# Patient Record
Sex: FEMALE | Race: BLACK OR AFRICAN AMERICAN | NOT HISPANIC OR LATINO
[De-identification: names, ages, dates, MRNs, and addresses within clinical notes are randomized per-mention and may not be internally consistent; named-entity substitution may affect disease eponyms.]

---

## 2019-04-17 ENCOUNTER — APPOINTMENT (OUTPATIENT)
Dept: OBGYN | Facility: CLINIC | Age: 49
End: 2019-04-17
Payer: COMMERCIAL

## 2019-04-17 VITALS — BODY MASS INDEX: 27.96 KG/M2 | DIASTOLIC BLOOD PRESSURE: 65 MMHG | SYSTOLIC BLOOD PRESSURE: 116 MMHG | WEIGHT: 168 LBS

## 2019-04-17 DIAGNOSIS — Z01.419 ENCOUNTER FOR GYNECOLOGICAL EXAMINATION (GENERAL) (ROUTINE) W/OUT ABNORMAL FINDINGS: ICD-10-CM

## 2019-04-17 DIAGNOSIS — N92.0 EXCESSIVE AND FREQUENT MENSTRUATION WITH REGULAR CYCLE: ICD-10-CM

## 2019-04-17 PROCEDURE — 99386 PREV VISIT NEW AGE 40-64: CPT

## 2019-04-17 PROCEDURE — 99213 OFFICE O/P EST LOW 20 MIN: CPT | Mod: 25

## 2019-04-17 RX ORDER — METRONIDAZOLE 7.5 MG/G
0.75 GEL VAGINAL
Qty: 7 | Refills: 2 | Status: ACTIVE | COMMUNITY
Start: 2019-04-17 | End: 1900-01-01

## 2019-04-17 NOTE — HISTORY OF PRESENT ILLNESS
[___ Year(s) Ago] : [unfilled] year(s) ago [Healthy Diet] : a healthy diet [Fair] : being in fair health [Weight Concerns] : no concerns with her weight [Last Pap ___] : Last cervical pap smear was [unfilled] [Menstrual Problems] : reports abnormal menses [Reproductive Age] : is of reproductive age [Definite ___ (Date)] : the last menstrual period was [unfilled] [Excessive Bleeding] : bleeding has been excessive [Contraception] : uses contraception [Regular Duration] : has been regular [IUD] : has an intrauterine device [Discharge] : discharge [Vagina] : vagina [Vaginal Odor Foul] : foul vaginal odor [Normal Amount/Duration] : was abnormal [Spotting Between  Menses] : no spotting between menses [Regular Cycle Intervals] : periods have been regular [Prior Menses:  ___ Date] : date of prior menstruation was [unfilled] [Sexually Active] : is sexually active

## 2019-04-17 NOTE — PROCEDURE
[Cervical Pap Smear] : cervical Pap smear [Affirm (Triple Culture)] : Affirm (triple culture) [Liquid Base] : liquid base

## 2019-04-17 NOTE — PHYSICAL EXAM
[Alert] : alert [Awake] : awake [Acute Distress] : no acute distress [Mass] : no breast mass [Nipple Discharge] : no nipple discharge [Soft] : soft [Axillary LAD] : no axillary lymphadenopathy [Tender] : non tender [Oriented x3] : oriented to person, place, and time [Normal] : uterus [No Bleeding] : there was no active vaginal bleeding [IUD String] : had an IUD string protruding out [Enlarged ___ wks] : enlarged [unfilled] ~Uweeks [Uterine Adnexae] : were not tender and not enlarged

## 2019-04-22 ENCOUNTER — APPOINTMENT (OUTPATIENT)
Dept: OBGYN | Facility: CLINIC | Age: 49
End: 2019-04-22
Payer: COMMERCIAL

## 2019-04-22 ENCOUNTER — ASOB RESULT (OUTPATIENT)
Age: 49
End: 2019-04-22

## 2019-04-22 PROCEDURE — 76830 TRANSVAGINAL US NON-OB: CPT

## 2019-04-25 ENCOUNTER — OTHER (OUTPATIENT)
Age: 49
End: 2019-04-25

## 2019-04-25 LAB
CANDIDA VAG CYTO: DETECTED
CYTOLOGY CVX/VAG DOC THIN PREP: NORMAL
G VAGINALIS+PREV SP MTYP VAG QL MICRO: DETECTED
HPV HIGH+LOW RISK DNA PNL CVX: NOT DETECTED
T VAGINALIS VAG QL WET PREP: NOT DETECTED

## 2019-04-25 RX ORDER — TERCONAZOLE 8 MG/G
0.8 CREAM VAGINAL DAILY
Qty: 3 | Refills: 0 | Status: ACTIVE | COMMUNITY
Start: 2019-04-25 | End: 1900-01-01

## 2019-05-07 ENCOUNTER — FORM ENCOUNTER (OUTPATIENT)
Age: 49
End: 2019-05-07

## 2019-05-08 ENCOUNTER — APPOINTMENT (OUTPATIENT)
Dept: MAMMOGRAPHY | Facility: CLINIC | Age: 49
End: 2019-05-08
Payer: COMMERCIAL

## 2019-05-08 ENCOUNTER — APPOINTMENT (OUTPATIENT)
Dept: ULTRASOUND IMAGING | Facility: CLINIC | Age: 49
End: 2019-05-08
Payer: COMMERCIAL

## 2019-05-08 ENCOUNTER — OUTPATIENT (OUTPATIENT)
Dept: OUTPATIENT SERVICES | Facility: HOSPITAL | Age: 49
LOS: 1 days | End: 2019-05-08
Payer: COMMERCIAL

## 2019-05-08 DIAGNOSIS — Z00.8 ENCOUNTER FOR OTHER GENERAL EXAMINATION: ICD-10-CM

## 2019-05-08 PROCEDURE — 76641 ULTRASOUND BREAST COMPLETE: CPT

## 2019-05-08 PROCEDURE — 77063 BREAST TOMOSYNTHESIS BI: CPT

## 2019-05-08 PROCEDURE — 76641 ULTRASOUND BREAST COMPLETE: CPT | Mod: 26,50

## 2019-05-08 PROCEDURE — 77067 SCR MAMMO BI INCL CAD: CPT | Mod: 26

## 2019-05-08 PROCEDURE — 77063 BREAST TOMOSYNTHESIS BI: CPT | Mod: 26

## 2019-05-08 PROCEDURE — 77067 SCR MAMMO BI INCL CAD: CPT

## 2019-06-18 ENCOUNTER — OTHER (OUTPATIENT)
Age: 49
End: 2019-06-18

## 2019-08-09 ENCOUNTER — APPOINTMENT (OUTPATIENT)
Dept: OBGYN | Facility: CLINIC | Age: 49
End: 2019-08-09
Payer: COMMERCIAL

## 2019-08-09 VITALS — SYSTOLIC BLOOD PRESSURE: 122 MMHG | BODY MASS INDEX: 27.46 KG/M2 | WEIGHT: 165 LBS | DIASTOLIC BLOOD PRESSURE: 74 MMHG

## 2019-08-09 DIAGNOSIS — R80.2 ORTHOSTATIC PROTEINURIA, UNSPECIFIED: ICD-10-CM

## 2019-08-09 DIAGNOSIS — Z87.440 PERSONAL HISTORY OF URINARY (TRACT) INFECTIONS: ICD-10-CM

## 2019-08-09 DIAGNOSIS — N89.8 OTHER SPECIFIED NONINFLAMMATORY DISORDERS OF VAGINA: ICD-10-CM

## 2019-08-09 DIAGNOSIS — Z86.19 PERSONAL HISTORY OF OTHER INFECTIOUS AND PARASITIC DISEASES: ICD-10-CM

## 2019-08-09 DIAGNOSIS — R31.9 HEMATURIA, UNSPECIFIED: ICD-10-CM

## 2019-08-09 LAB
BILIRUB UR QL STRIP: NORMAL
GLUCOSE UR-MCNC: NORMAL
HCG UR QL: 2 EU/DL
HGB UR QL STRIP.AUTO: NORMAL
KETONES UR-MCNC: NORMAL
LEUKOCYTE ESTERASE UR QL STRIP: NORMAL
NITRITE UR QL STRIP: NORMAL
PH UR STRIP: 7
PROT UR STRIP-MCNC: NORMAL
SP GR UR STRIP: 1.02

## 2019-08-09 PROCEDURE — 81002 URINALYSIS NONAUTO W/O SCOPE: CPT

## 2019-08-09 PROCEDURE — 99212 OFFICE O/P EST SF 10 MIN: CPT

## 2019-08-09 RX ORDER — METRONIDAZOLE 7.5 MG/G
0.75 GEL VAGINAL
Qty: 1 | Refills: 3 | Status: ACTIVE | COMMUNITY
Start: 2019-08-09 | End: 1900-01-01

## 2019-08-09 NOTE — PHYSICAL EXAM
[Labia Majora] : labia major [Labia Minora] : labia minora [Normal] : clitoris [FreeTextEntry4] : moderate white homogenous discharge Affirm done [FreeTextEntry7] : Bladder non tender

## 2019-08-12 LAB
BACTERIA UR CULT: ABNORMAL
CANDIDA VAG CYTO: DETECTED
G VAGINALIS+PREV SP MTYP VAG QL MICRO: DETECTED
T VAGINALIS VAG QL WET PREP: NOT DETECTED

## 2019-08-12 RX ORDER — FLUCONAZOLE 150 MG/1
150 TABLET ORAL
Qty: 2 | Refills: 1 | Status: ACTIVE | COMMUNITY
Start: 2019-08-12 | End: 1900-01-01

## 2019-08-12 RX ORDER — NITROFURANTOIN (MONOHYDRATE/MACROCRYSTALS) 25; 75 MG/1; MG/1
100 CAPSULE ORAL
Qty: 10 | Refills: 0 | Status: ACTIVE | COMMUNITY
Start: 2019-08-12 | End: 1900-01-01

## 2020-02-05 ENCOUNTER — APPOINTMENT (OUTPATIENT)
Dept: OBGYN | Facility: CLINIC | Age: 50
End: 2020-02-05
Payer: COMMERCIAL

## 2020-02-05 VITALS — BODY MASS INDEX: 25.63 KG/M2 | WEIGHT: 154 LBS | DIASTOLIC BLOOD PRESSURE: 75 MMHG | SYSTOLIC BLOOD PRESSURE: 107 MMHG

## 2020-02-05 DIAGNOSIS — N89.8 OTHER SPECIFIED NONINFLAMMATORY DISORDERS OF VAGINA: ICD-10-CM

## 2020-02-05 PROCEDURE — 99213 OFFICE O/P EST LOW 20 MIN: CPT

## 2020-02-05 NOTE — PHYSICAL EXAM
[Normal] : uterus [Discharge] : a  ~M vaginal discharge was present [No Bleeding] : there was no active vaginal bleeding [Uterine Adnexae] : were not tender and not enlarged [IUD String] : had an IUD string protruding out

## 2020-02-11 LAB
CANDIDA VAG CYTO: NOT DETECTED
G VAGINALIS+PREV SP MTYP VAG QL MICRO: DETECTED
T VAGINALIS VAG QL WET PREP: NOT DETECTED

## 2020-02-11 RX ORDER — TINIDAZOLE 500 MG/1
500 TABLET, FILM COATED ORAL DAILY
Qty: 10 | Refills: 0 | Status: ACTIVE | COMMUNITY
Start: 2020-02-11 | End: 1900-01-01

## 2020-02-26 ENCOUNTER — ASOB RESULT (OUTPATIENT)
Age: 50
End: 2020-02-26

## 2020-02-26 ENCOUNTER — APPOINTMENT (OUTPATIENT)
Dept: OBGYN | Facility: CLINIC | Age: 50
End: 2020-02-26
Payer: COMMERCIAL

## 2020-02-26 DIAGNOSIS — Z30.432 ENCOUNTER FOR REMOVAL OF INTRAUTERINE CONTRACEPTIVE DEVICE: ICD-10-CM

## 2020-02-26 DIAGNOSIS — Z30.430 ENCOUNTER FOR INSERTION OF INTRAUTERINE CONTRACEPTIVE DEVICE: ICD-10-CM

## 2020-02-26 PROCEDURE — 58301 REMOVE INTRAUTERINE DEVICE: CPT

## 2020-02-26 PROCEDURE — 58300 INSERT INTRAUTERINE DEVICE: CPT

## 2020-02-26 PROCEDURE — 76830 TRANSVAGINAL US NON-OB: CPT

## 2020-02-26 NOTE — PROCEDURE
[Prevention of Pregnancy] : prevention of pregnancy [Bleeding] : bleeding [Infection] : infection [Expulsion] : expulsion [Pain] : pain [Failure] : failure [Uterine Perforation] : uterine perforation [CONSENT OBTAINED] : written consent was obtained prior to the procedure. [LMP ___] : LMP was [unfilled] [No Premedication] : No premedication [Tenaculum] : a single toothed tenaculum [Uterus Sounded to ___cm] : sounded to [unfilled]Ucm [Mirena IUD] : The Mirena IUD was inserted past the internal cervical os. The IUD was then gently inserted upwards toward the fundus.  The IUD strings were cut to an appropriate length. [Easy Passage] : allowed easy passage of a uterine sound without dilation [Lot Number: ___] : IUD lot number: [unfilled] [IUD Removal] : IUD [None] : no post-procedure medications given [ IUD] :  IUD [Paraguard] : Franck [Risks] : risks [Patient] : patient [Benefits] : benefits [Alternatives] : alternatives [Consent Obtained] : consent was obtained prior to the procedure and is detailed in the patient's record [Speculum Placed] : a speculum was placed in the vagina [Strings Visualized] : the IUD strings were visualized [IUD Removed - Forceps] : the strings were grasped with forceps and the IUD was removed [IUD Discarded] : discarded [Tolerated Well] : the patient tolerated the procedure well [No Complications] : none [___ Week(s)] : in [unfilled] week(s)

## 2020-05-27 ENCOUNTER — APPOINTMENT (OUTPATIENT)
Dept: OBGYN | Facility: CLINIC | Age: 50
End: 2020-05-27

## 2020-11-20 ENCOUNTER — NON-APPOINTMENT (OUTPATIENT)
Age: 50
End: 2020-11-20

## 2020-11-23 ENCOUNTER — APPOINTMENT (OUTPATIENT)
Dept: OBGYN | Facility: CLINIC | Age: 50
End: 2020-11-23
Payer: COMMERCIAL

## 2020-11-23 ENCOUNTER — ASOB RESULT (OUTPATIENT)
Age: 50
End: 2020-11-23

## 2020-11-23 ENCOUNTER — APPOINTMENT (OUTPATIENT)
Dept: OBGYN | Facility: CLINIC | Age: 50
End: 2020-11-23

## 2020-11-23 VITALS
WEIGHT: 165 LBS | DIASTOLIC BLOOD PRESSURE: 69 MMHG | SYSTOLIC BLOOD PRESSURE: 115 MMHG | BODY MASS INDEX: 27.49 KG/M2 | HEIGHT: 65 IN

## 2020-11-23 DIAGNOSIS — D21.9 BENIGN NEOPLASM OF CONNECTIVE AND OTHER SOFT TISSUE, UNSPECIFIED: ICD-10-CM

## 2020-11-23 DIAGNOSIS — N92.6 IRREGULAR MENSTRUATION, UNSPECIFIED: ICD-10-CM

## 2020-11-23 DIAGNOSIS — Z97.5 PRESENCE OF (INTRAUTERINE) CONTRACEPTIVE DEVICE: ICD-10-CM

## 2020-11-23 PROCEDURE — 99214 OFFICE O/P EST MOD 30 MIN: CPT

## 2020-11-23 PROCEDURE — 76830 TRANSVAGINAL US NON-OB: CPT

## 2020-11-23 NOTE — HISTORY OF PRESENT ILLNESS
[Irregular Menses] : irregular menses [Heavy Bleeding] : described as heavy in severity [IUD] : using an IUD [Fibroids] : fibroids

## 2020-11-23 NOTE — PHYSICAL EXAM
[Normal] : uterus [Moderate] : there was moderate vaginal bleeding [IUD String] : did not have an IUD string protruding out [Uterine Adnexae] : were not tender and not enlarged

## 2020-12-21 PROBLEM — Z86.19 HISTORY OF CANDIDIASIS OF VAGINA: Status: RESOLVED | Noted: 2019-08-12 | Resolved: 2020-12-21

## 2020-12-21 PROBLEM — Z87.440 HISTORY OF URINARY TRACT INFECTION: Status: RESOLVED | Noted: 2019-08-12 | Resolved: 2020-12-21

## 2020-12-21 PROBLEM — Z01.419 ENCOUNTER FOR GYNECOLOGICAL EXAMINATION: Status: RESOLVED | Noted: 2019-04-17 | Resolved: 2020-12-21

## 2020-12-22 ENCOUNTER — FORM ENCOUNTER (OUTPATIENT)
Age: 50
End: 2020-12-22

## 2020-12-23 ENCOUNTER — APPOINTMENT (OUTPATIENT)
Dept: OBGYN | Facility: CLINIC | Age: 50
End: 2020-12-23
Payer: COMMERCIAL

## 2020-12-23 PROCEDURE — 99243 OFF/OP CNSLTJ NEW/EST LOW 30: CPT

## 2020-12-23 PROCEDURE — 99072 ADDL SUPL MATRL&STAF TM PHE: CPT

## 2021-01-11 ENCOUNTER — FORM ENCOUNTER (OUTPATIENT)
Age: 51
End: 2021-01-11

## 2021-02-24 ENCOUNTER — FORM ENCOUNTER (OUTPATIENT)
Age: 51
End: 2021-02-24

## 2021-02-25 ENCOUNTER — RESULT REVIEW (OUTPATIENT)
Age: 51
End: 2021-02-25

## 2021-02-25 ENCOUNTER — APPOINTMENT (OUTPATIENT)
Dept: OBGYN | Facility: CLINIC | Age: 51
End: 2021-02-25
Payer: COMMERCIAL

## 2021-02-25 PROCEDURE — 58100 BIOPSY OF UTERUS LINING: CPT

## 2021-02-25 PROCEDURE — 99072 ADDL SUPL MATRL&STAF TM PHE: CPT

## 2021-02-28 ENCOUNTER — FORM ENCOUNTER (OUTPATIENT)
Age: 51
End: 2021-02-28

## 2021-03-17 ENCOUNTER — FORM ENCOUNTER (OUTPATIENT)
Age: 51
End: 2021-03-17

## 2021-03-24 ENCOUNTER — APPOINTMENT (OUTPATIENT)
Dept: MRI IMAGING | Facility: CLINIC | Age: 51
End: 2021-03-24
Payer: COMMERCIAL

## 2021-03-24 ENCOUNTER — RESULT REVIEW (OUTPATIENT)
Age: 51
End: 2021-03-24

## 2021-03-24 ENCOUNTER — OUTPATIENT (OUTPATIENT)
Dept: OUTPATIENT SERVICES | Facility: HOSPITAL | Age: 51
LOS: 1 days | End: 2021-03-24
Payer: COMMERCIAL

## 2021-03-24 DIAGNOSIS — Z00.8 ENCOUNTER FOR OTHER GENERAL EXAMINATION: ICD-10-CM

## 2021-03-24 PROCEDURE — 72195 MRI PELVIS W/O DYE: CPT | Mod: 26

## 2021-03-24 PROCEDURE — 72195 MRI PELVIS W/O DYE: CPT

## 2022-06-14 DIAGNOSIS — Z86.39 PERSONAL HISTORY OF OTHER ENDOCRINE, NUTRITIONAL AND METABOLIC DISEASE: ICD-10-CM

## 2022-06-14 DIAGNOSIS — Z83.3 FAMILY HISTORY OF DIABETES MELLITUS: ICD-10-CM

## 2022-06-14 RX ORDER — ASCORBIC ACID 500 MG
TABLET ORAL
Refills: 0 | Status: ACTIVE | COMMUNITY

## 2022-06-14 RX ORDER — CHROMIUM 200 MCG
TABLET ORAL
Refills: 0 | Status: ACTIVE | COMMUNITY

## 2022-06-14 RX ORDER — IRON/IRON ASP GLY/FA/MV-MIN 38 125-25-1MG
TABLET ORAL
Refills: 0 | Status: ACTIVE | COMMUNITY

## 2022-07-07 ENCOUNTER — APPOINTMENT (OUTPATIENT)
Dept: OBGYN | Facility: CLINIC | Age: 52
End: 2022-07-07

## 2023-02-14 ENCOUNTER — EMERGENCY (EMERGENCY)
Facility: HOSPITAL | Age: 53
LOS: 1 days | Discharge: HOME | End: 2023-02-14
Attending: EMERGENCY MEDICINE | Admitting: EMERGENCY MEDICINE
Payer: COMMERCIAL

## 2023-02-14 VITALS
DIASTOLIC BLOOD PRESSURE: 68 MMHG | HEART RATE: 78 BPM | RESPIRATION RATE: 18 BRPM | TEMPERATURE: 99 F | SYSTOLIC BLOOD PRESSURE: 114 MMHG | OXYGEN SATURATION: 99 %

## 2023-02-14 VITALS
HEART RATE: 82 BPM | OXYGEN SATURATION: 100 % | DIASTOLIC BLOOD PRESSURE: 75 MMHG | RESPIRATION RATE: 18 BRPM | TEMPERATURE: 99 F | SYSTOLIC BLOOD PRESSURE: 126 MMHG

## 2023-02-14 DIAGNOSIS — Z98.890 OTHER SPECIFIED POSTPROCEDURAL STATES: Chronic | ICD-10-CM

## 2023-02-14 LAB
ALBUMIN SERPL ELPH-MCNC: 4.3 G/DL — SIGNIFICANT CHANGE UP (ref 3.3–5)
ALP SERPL-CCNC: 91 U/L — SIGNIFICANT CHANGE UP (ref 40–120)
ALT FLD-CCNC: 12 U/L — SIGNIFICANT CHANGE UP (ref 4–33)
ANION GAP SERPL CALC-SCNC: 10 MMOL/L — SIGNIFICANT CHANGE UP (ref 7–14)
AST SERPL-CCNC: 21 U/L — SIGNIFICANT CHANGE UP (ref 4–32)
BASOPHILS # BLD AUTO: 0.02 K/UL — SIGNIFICANT CHANGE UP (ref 0–0.2)
BASOPHILS NFR BLD AUTO: 0.3 % — SIGNIFICANT CHANGE UP (ref 0–2)
BILIRUB SERPL-MCNC: 0.7 MG/DL — SIGNIFICANT CHANGE UP (ref 0.2–1.2)
BUN SERPL-MCNC: 8 MG/DL — SIGNIFICANT CHANGE UP (ref 7–23)
CALCIUM SERPL-MCNC: 9.9 MG/DL — SIGNIFICANT CHANGE UP (ref 8.4–10.5)
CHLORIDE SERPL-SCNC: 104 MMOL/L — SIGNIFICANT CHANGE UP (ref 98–107)
CO2 SERPL-SCNC: 25 MMOL/L — SIGNIFICANT CHANGE UP (ref 22–31)
CREAT SERPL-MCNC: 0.82 MG/DL — SIGNIFICANT CHANGE UP (ref 0.5–1.3)
EGFR: 87 ML/MIN/1.73M2 — SIGNIFICANT CHANGE UP
EOSINOPHIL # BLD AUTO: 0.07 K/UL — SIGNIFICANT CHANGE UP (ref 0–0.5)
EOSINOPHIL NFR BLD AUTO: 1.1 % — SIGNIFICANT CHANGE UP (ref 0–6)
FLUAV AG NPH QL: SIGNIFICANT CHANGE UP
FLUBV AG NPH QL: SIGNIFICANT CHANGE UP
GLUCOSE SERPL-MCNC: 103 MG/DL — HIGH (ref 70–99)
HCT VFR BLD CALC: 37.8 % — SIGNIFICANT CHANGE UP (ref 34.5–45)
HGB BLD-MCNC: 12.1 G/DL — SIGNIFICANT CHANGE UP (ref 11.5–15.5)
IANC: 4.7 K/UL — SIGNIFICANT CHANGE UP (ref 1.8–7.4)
IMM GRANULOCYTES NFR BLD AUTO: 0.2 % — SIGNIFICANT CHANGE UP (ref 0–0.9)
LYMPHOCYTES # BLD AUTO: 1.06 K/UL — SIGNIFICANT CHANGE UP (ref 1–3.3)
LYMPHOCYTES # BLD AUTO: 17.1 % — SIGNIFICANT CHANGE UP (ref 13–44)
MCHC RBC-ENTMCNC: 26.1 PG — LOW (ref 27–34)
MCHC RBC-ENTMCNC: 32 GM/DL — SIGNIFICANT CHANGE UP (ref 32–36)
MCV RBC AUTO: 81.5 FL — SIGNIFICANT CHANGE UP (ref 80–100)
MONOCYTES # BLD AUTO: 0.33 K/UL — SIGNIFICANT CHANGE UP (ref 0–0.9)
MONOCYTES NFR BLD AUTO: 5.3 % — SIGNIFICANT CHANGE UP (ref 2–14)
NEUTROPHILS # BLD AUTO: 4.7 K/UL — SIGNIFICANT CHANGE UP (ref 1.8–7.4)
NEUTROPHILS NFR BLD AUTO: 76 % — SIGNIFICANT CHANGE UP (ref 43–77)
NRBC # BLD: 0 /100 WBCS — SIGNIFICANT CHANGE UP (ref 0–0)
NRBC # FLD: 0 K/UL — SIGNIFICANT CHANGE UP (ref 0–0)
PLATELET # BLD AUTO: 178 K/UL — SIGNIFICANT CHANGE UP (ref 150–400)
POTASSIUM SERPL-MCNC: 4 MMOL/L — SIGNIFICANT CHANGE UP (ref 3.5–5.3)
POTASSIUM SERPL-SCNC: 4 MMOL/L — SIGNIFICANT CHANGE UP (ref 3.5–5.3)
PROT SERPL-MCNC: 8.1 G/DL — SIGNIFICANT CHANGE UP (ref 6–8.3)
RBC # BLD: 4.64 M/UL — SIGNIFICANT CHANGE UP (ref 3.8–5.2)
RBC # FLD: 17.8 % — HIGH (ref 10.3–14.5)
RSV RNA NPH QL NAA+NON-PROBE: SIGNIFICANT CHANGE UP
SARS-COV-2 RNA SPEC QL NAA+PROBE: SIGNIFICANT CHANGE UP
SODIUM SERPL-SCNC: 139 MMOL/L — SIGNIFICANT CHANGE UP (ref 135–145)
WBC # BLD: 6.19 K/UL — SIGNIFICANT CHANGE UP (ref 3.8–10.5)
WBC # FLD AUTO: 6.19 K/UL — SIGNIFICANT CHANGE UP (ref 3.8–10.5)

## 2023-02-14 PROCEDURE — 70490 CT SOFT TISSUE NECK W/O DYE: CPT | Mod: 26,MG

## 2023-02-14 PROCEDURE — 99285 EMERGENCY DEPT VISIT HI MDM: CPT

## 2023-02-14 PROCEDURE — G1004: CPT

## 2023-02-14 RX ORDER — AMPICILLIN SODIUM AND SULBACTAM SODIUM 250; 125 MG/ML; MG/ML
3 INJECTION, POWDER, FOR SUSPENSION INTRAMUSCULAR; INTRAVENOUS ONCE
Refills: 0 | Status: COMPLETED | OUTPATIENT
Start: 2023-02-14 | End: 2023-02-14

## 2023-02-14 RX ORDER — KETOROLAC TROMETHAMINE 30 MG/ML
15 SYRINGE (ML) INJECTION ONCE
Refills: 0 | Status: DISCONTINUED | OUTPATIENT
Start: 2023-02-14 | End: 2023-02-14

## 2023-02-14 RX ADMIN — Medication 15 MILLIGRAM(S): at 10:16

## 2023-02-14 RX ADMIN — AMPICILLIN SODIUM AND SULBACTAM SODIUM 200 GRAM(S): 250; 125 INJECTION, POWDER, FOR SUSPENSION INTRAMUSCULAR; INTRAVENOUS at 10:25

## 2023-02-14 NOTE — ED PROVIDER NOTE - OBJECTIVE STATEMENT
52F with PMH hypothyroidism who presents to ED with left-sided facial swelling x 4 days. Denies fever, chills, chest pain, shortness of breath, abdominal pain, N/V/D, dysuria, urinary frequency/urgency, extremity weakness/numbness/tingling, lightheadedness, dizziness, or headaches. 52F with PMH hypothyroidism who presents to ED with left-sided facial swelling x 4 days. Reporting gradual onset of left-sided facial swelling over the past 4 days, pain is worsened when patient touches the affected area, no associated neck pain, no difficulty swallowing, no trauma/reported injury. Pt saw dentist Dr. Lorie Muse for symptoms and was told there was a fracture to tooth #19 with swelling, pt was not started on antibiotics, referred to ED for CT to r/o abscess and for further intervention. Denies fever, chills, chest pain, shortness of breath, difficulty swallowing, abdominal pain, N/V/D, dysuria, urinary frequency/urgency, extremity weakness/numbness/tingling, lightheadedness, neck pain, dizziness, or headaches. 52F with PMH Hypothyroidism who presents to ED with left-sided facial swelling x 4 days. Reporting gradual onset of left-sided facial swelling over the past 4 days, pain is worsened when patient touches the affected area, currently no pain at rest, pt has not taken any OTC medications for relief, no associated neck pain, no difficulty swallowing, no trauma/reported injury. Pt saw dentist Dr. Lorie Muse for symptoms and was told there was a fracture to tooth #19 with surrounding swelling, pt was not started on antibiotics, referred to ED for CT to r/o abscess and for further intervention. Denies history of similar symptoms in the past. Denies fever, chills, chest pain, shortness of breath, difficulty swallowing, abdominal pain, N/V/D, dysuria, urinary frequency/urgency, extremity weakness/numbness/tingling, lightheadedness, neck pain, dizziness, or headaches.

## 2023-02-14 NOTE — ED ADULT NURSE NOTE - OBJECTIVE STATEMENT
Pt received to intake room 15 A&XO4 ambulatory c/o tooth pain x 4 days. Pt states "I think it's infected." Denies chills/fevers. 20G IV placed in LAC. Labs drawn and sent. Medicated as per EMAR. Pending CT.

## 2023-02-14 NOTE — ED PROVIDER NOTE - CLINICAL SUMMARY MEDICAL DECISION MAKING FREE TEXT BOX
52F with PMH hypothyroidism who presents to ED with left-sided facial swelling x 4 days. Patient currently afebrile, hemodynamically stable, spO2 100%. Based on history and physical, differentials include but are not limited to dental fracture, dental abscess, submandibular/sublingual abscess. Plan to assess patient for acute pathology as listed above with Labs, CT Maxillofacial. Will administer IV antibiotics, medications for pain relief, follow-up on results, and reassess. 52F with PMH hypothyroidism who presents to ED with left-sided facial swelling x 4 days. Patient currently afebrile, hemodynamically stable, spO2 100%. Based on history and physical, differentials include but are not limited to dental fracture, dental abscess, submandibular/sublingual abscess. Plan to assess patient for acute pathology as listed above with Labs, CT Maxillofacial/Neck. Will administer IV antibiotics, medications for pain relief, follow-up on results, and reassess.

## 2023-02-14 NOTE — ED PROVIDER NOTE - LEFT FACE
left-sided mandibular swelling with tenderness to palpation/SWELLING/TENDERNESS TO PALPATION Left-sided mandibular swelling with tenderness to palpation, No TMJ or mastoid ttp/SWELLING/TENDERNESS TO PALPATION

## 2023-02-14 NOTE — PROGRESS NOTE ADULT - SUBJECTIVE AND OBJECTIVE BOX
MEDICATIONS  (STANDING):    MEDICATIONS  (PRN):      Allergies    No Known Allergies      Vital Signs Last 24 Hrs  T(C): 37.2 (14 Feb 2023 12:47), Max: 37.2 (14 Feb 2023 12:47)  T(F): 99 (14 Feb 2023 12:47), Max: 99 (14 Feb 2023 12:47)  HR: 69 (14 Feb 2023 12:47) (69 - 82)  BP: 108/64 (14 Feb 2023 12:47) (108/64 - 126/75)  BP(mean): --  RR: 18 (14 Feb 2023 12:47) (18 - 18)  SpO2: 100% (14 Feb 2023 12:47) (100% - 100%)    Parameters below as of 14 Feb 2023 12:47  Patient On (Oxygen Delivery Method): room air        LABS:                        12.1   6.19  )-----------( 178      ( 14 Feb 2023 10:15 )             37.8     02-14    139  |  104  |  8   ----------------------------<  103<H>  4.0   |  25  |  0.82    Ca    9.9      14 Feb 2023 10:15    TPro  8.1  /  Alb  4.3  /  TBili  0.7  /  DBili  x   /  AST  21  /  ALT  12  /  AlkPhos  91  02-14    WBC Count: 6.19 K/uL [3.80 - 10.50] (02-14 @ 10:15)  Platelet Count - Automated: 178 K/uL [150 - 400] (02-14 @ 10:15)    Dental paged for tooth pain and swelling.  HPI: Pt states tooth pain and swelling began 4 days ago.    CLINICAL EXAM:  EOE:   (+) asymmetry and swelling, left body of the mandible. Palpable angle of the mandible.  (+) palpation, in the area of swelling    IOE: Vestibular fluctuance noted in the lower left vestibule approximate to tooth #19. Pain on palpation.     DENTAL RADIOGRAPHS: Panoramic image taken and reviewed. Fractured, carious #19 with large M root PARL.  No hard tissue pathology.    ASSESSMENT: Vestibular abscess associated with tooth #19.   PLAN: Incision and drainage of lower left vestibular area    PROCEDURE: Incision and drainage of lower left vestibular area. Discussed procedural details as well as risks and benefits.   Verbal and written informed consent given. All pt questions answered.  Anesthesia: 1 carpule of 2% lidocaine with 1:100,000 epinephrine administered via L IANB and 1 carpule of 3% mepivicaine plain administered via local infiltration with changing of needles.   Procedure: Incised with #15 blade. Blunt dissection of fascial planes to bones. Irrigated with copious amounts of saline.     RECOMMENDATIONS:  1) Antibiotics and pain medications as per ED Team  2) Follow up with outpatient dentist or [facility] ([phone]) for extraction of #19     Liv Ang, DMD #[id number]   MEDICATIONS  (STANDING):    MEDICATIONS  (PRN):      Allergies    No Known Allergies      Vital Signs Last 24 Hrs  T(C): 37.2 (14 Feb 2023 12:47), Max: 37.2 (14 Feb 2023 12:47)  T(F): 99 (14 Feb 2023 12:47), Max: 99 (14 Feb 2023 12:47)  HR: 69 (14 Feb 2023 12:47) (69 - 82)  BP: 108/64 (14 Feb 2023 12:47) (108/64 - 126/75)  BP(mean): --  RR: 18 (14 Feb 2023 12:47) (18 - 18)  SpO2: 100% (14 Feb 2023 12:47) (100% - 100%)    Parameters below as of 14 Feb 2023 12:47  Patient On (Oxygen Delivery Method): room air        LABS:                        12.1   6.19  )-----------( 178      ( 14 Feb 2023 10:15 )             37.8     02-14    139  |  104  |  8   ----------------------------<  103<H>  4.0   |  25  |  0.82    Ca    9.9      14 Feb 2023 10:15    TPro  8.1  /  Alb  4.3  /  TBili  0.7  /  DBili  x   /  AST  21  /  ALT  12  /  AlkPhos  91  02-14    WBC Count: 6.19 K/uL [3.80 - 10.50] (02-14 @ 10:15)  Platelet Count - Automated: 178 K/uL [150 - 400] (02-14 @ 10:15)    Dental paged for tooth pain and swelling.  HPI: Pt states tooth pain and swelling began 4 days ago.    CLINICAL EXAM:  EOE:   (+) asymmetry and swelling, left body of the mandible. Palpable angle of the mandible.  (+) palpation, in the area of swelling    IOE: Vestibular fluctuance noted in the lower left vestibule approximate to tooth #19. Pain on palpation.     DENTAL RADIOGRAPHS: Panoramic image taken and reviewed. Fractured, carious #19 with large M root PARL.  No hard tissue pathology.    ASSESSMENT: Vestibular abscess associated with tooth #19.   PLAN: Incision and drainage of lower left vestibular area    PROCEDURE: Incision and drainage of lower left vestibular area. Discussed procedural details as well as risks and benefits.   Verbal informed consent given. All pt questions answered.  Anesthesia: 1 carpule of 2% lidocaine with 1:100,000 epinephrine administered via L IANB and 1 carpule of 3% mepivicaine plain administered via local infiltration with changing of needles.   Procedure: Incised with #15 blade. Blunt dissection of fascial planes to bones. Irrigated with copious amounts of saline.     RECOMMENDATIONS:  1) Antibiotics and pain medications as per ED Team  2) Follow up with outpatient dentist or [facility] ([phone]) for extraction of #19     Liv Ang, DMD #[id number]

## 2023-02-14 NOTE — ED PROVIDER NOTE - PATIENT PORTAL LINK FT
You can access the FollowMyHealth Patient Portal offered by [facility] by registering at the following website: [email or web address]. By joining [email or web address]’s FollowMyHealth portal, you will also be able to view your health information using other applications (apps) compatible with our system.

## 2023-02-14 NOTE — ED PROVIDER NOTE - ATTENDING CONTRIBUTION TO CARE
I performed a face to face evaluation of this patient and performed a full history and physical examination on the patient.  I agree with the resident's history, physical examination, and plan of the patient.  52F with PMH hypothyroidism who presents to ED with left-sided facial swelling x 4 days. Patient currently afebrile, hemodynamically stable, spO2 100%. Based on history and physical, differentials include but are not limited to dental fracture, dental abscess, submandibular/sublingual abscess. Plan to assess patient for acute pathology as listed above with Labs, CT Maxillofacial/Neck. Will administer IV antibiotics, medications for pain relief, follow-up on results, and reassess.    Pt needs advanced workup including ct, labs, pain medications, and dental consult.  Concern for abscess, osteomyelitis, and possible need for cdu or admission pending CT results and dental consult

## 2023-02-14 NOTE — ED PROVIDER NOTE - NSFOLLOWUPINSTRUCTIONS_ED_ALL_ED_FT
Please call [facility] to make an appointment tomorrow for follow-up with [facility] for extraction of Tooth #19.    [facility]  Phone: [phone]  Address: [phone] [place], [place] [address]  Email: [email or web address]    Medications:  Augmentin 875 mg, two times a day (every 12 hours) for 1 week.    Take Tylenol (Acetaminophen) 650 mg every 6 hours AND/OR Motrin (Ibuprofen) 600 mg every 8 hours as needed for pain.    Advance activity as tolerated.  Continue all previously prescribed medications as directed unless otherwise instructed.  Follow up with your primary care physician in 48-72 hours- bring copies of your results.  Return to the ER for worsening or persistent symptoms, and/or ANY NEW OR CONCERNING SYMPTOMS high fever not improved with medications, worsening or spreading of swelling/pain/redness of the affected left side of the face, difficulty swallowing/breathing. If you have issues obtaining follow up, please call: [phone] to obtain a doctor or specialist who takes your insurance in your area.  You may call [phone] to make an appointment with the internal medicine clinic.       dental abscess is an area of pus in or around a tooth. It comes from an infection. It can cause pain and other symptoms. Treatment will help with symptoms and prevent the infection from spreading.      What are the causes?    This condition is caused by an infection in or around the tooth. This can be from:  •Very bad tooth decay (cavities).      •A bad injury to the tooth, such as a broken or chipped tooth.        What increases the risk?    The risk to get an abscess is higher in males. It is also more likely in people who:  •Have dental decay.      •Have very bad gum disease.      •Eat sugary snacks between meals.      •Use tobacco.      •Have diabetes.      •Have a weak disease-fighting system (immune system).      •Do not brush their teeth regularly.        What are the signs or symptoms?    Some mild symptoms are:  •Tenderness.      •Bad breath.      •Fever.      •A sharp, sour taste in the mouth.      •Pain in and around the infected tooth.      Worse symptoms of this condition include:  •Swollen neck glands.      •Chills.      •Pus draining around the tooth.      •Swelling and redness around the tooth, the mouth, or the face.      •Very bad pain in and around the tooth.      The worst symptoms can include:  •Difficulty swallowing.      •Difficulty opening your mouth.      •Feeling like you may vomit or vomiting.        How is this treated?    This is treated by getting rid of the infection. Your dentist will discuss ways to do this, including:  •Antibiotic medicines.      •Antibacterial mouth rinse.      •An incision in the abscess to drain out the pus.      •A root canal.      •Removing the tooth.        Follow these instructions at home:    Medicines     •Take over-the-counter and prescription medicines only as told by your dentist.      •If you were prescribed an antibiotic medicine, take it as told by your dentist. Do not stop taking it even if you start to feel better.      •If you were prescribed a gel that has numbing medicine in it, use it exactly as told.      •Ask your dentist if you should avoid driving or using machines while you are taking your medicine.      General instructions     •Rinse your mouth often with salt water. To make salt water, dissolve ½–1 tsp (3–6 g) of salt in 1 cup (237 mL) of warm water.      •Eat a soft diet while your mouth is healing.      •Drink enough fluid to keep your pee (urine) pale yellow.      • Do not apply heat to the outside of your mouth.      • Do not smoke or use any products that contain nicotine or tobacco. If you need help quitting, ask your dentist.      •Keep all follow-up visits.      Prevent an abscess     •Brush your teeth every morning and every night. Use fluoride toothpaste.      •Floss your teeth each day.      •Get dental cleanings as often as told by your dentist.      •Think about getting dental sealant put on teeth that have deep holes (decay).    •Drink water that has fluoride in it.  •Most tap water has fluoride.      •Check the label on bottled water to see if it has fluoride in it.        •Drink water instead of sugary drinks.       •Eat healthy meals and snacks.       •Wear a mouth guard or face shield when you play sports.        Contact a doctor if:    •Your pain is worse and medicine does not help.        Get help right away if:    •You have a fever or chills.      •Your symptoms suddenly get worse.      •You have a very bad headache.      •You have problems breathing or swallowing.      •You have trouble opening your mouth.      •You have swelling in your neck or close to your eye.      These symptoms may be an emergency. Get help right away. Call your local emergency services (911 in the [place]   • Do not wait to see if the symptoms will go away.       • Do not drive yourself to the hospital.         Summary    •A dental abscess is an area of pus in or around a tooth. It is caused by an infection.      •Treatment will help with symptoms and prevent the infection from spreading.      •Take over-the-counter and prescription medicines only as told by your dentist.      •To prevent an abscess, take good care of your teeth. Brush your teeth every morning and night. Use floss every day.       •Get dental cleanings as often as told by your dentist.      This information is not intended to replace advice given to you by your health care provider. Make sure you discuss any questions you have with your health care provider.

## 2023-02-14 NOTE — ED PROVIDER NOTE - TOOTH FINDINGS
Tooth 19 fracture with tenderness to palpation Tooth #19 fracture with some surrounding swelling/tenderness to palpation

## 2023-02-14 NOTE — ED PROVIDER NOTE - WORK/EXCUSE FORM DATE
[Takes medication as prescribed] : takes [None] : Patient does not have any barriers to medication adherence 17-Feb-2023

## 2023-02-14 NOTE — ED PROVIDER NOTE - PROGRESS NOTE DETAILS
JULIUS Nguyen: Patient pending CT Neck to r/o abscess - patient refusing use of IV Contrast, Dr. Jimenez counseled patient that CT without IV contrast may not give us proper visualization of abscess, patient still refusing IV contrast, will order CT Neck soft tissues without contrast. Pending results. JULIUS Ngyuen: CT Neck results with - Dental consulted. JULIUS Nguyen: CT Neck results with carious left-sided first mandibular molar tooth with surrounding periapical radiolucency compatible with apical periodontitis. Surrounding soft tissue swelling and inflammatory changes. Evaluation for an underlying buccal margin abscess is limited secondary to exclusion of IV contrast. Multiple additional carious teeth and periapical radiolucencies. Dental consulted, Dental to see patient. JULIUS Nguyen: Workup reviewed. Results endorsed including unexpected incidental findings (copy of reports provided to patient). Shared Decision Making - Reassessment performed. Patient is medically stable for discharge. Strict return precautions given. Patient to follow up with PMD. Patient displays understanding and agreeable with plan. Plan for discharge discussed with  who agrees with disposition and discharge plan. JULIUS Nguyen: Patient pending CT Neck to r/o abscess - patient refusing use of IV Contrast, Dr. Jimenez counseled patient that CT without IV contrast may not give us proper visualization of possible abscess, patient still refusing IV contrast, will order CT Neck soft tissues without contrast. Pending CT results. JULIUS Nguyen: Workup reviewed - Labs within normal limits/unremarkable/non-actionable at this time. Results endorsed including unexpected incidental findings (copy of reports provided to patient). Shared Decision Making - Reassessment performed, patient seen by Dental (left vestibular abscess drained by dental in ED), Dental recommends outpatient follow-up with [facility] within next 2-3 days, and outpatient Augmentin 875 mg BID x 1 week. Patient is medically stable for discharge, patient comfortable with plan for discharge  home. Strict return precautions given. Patient to follow up with PMD and outpatient [facility]. Patient displays understanding and agreeable with plan. Plan for discharge discussed with Dr. Lew who agrees with disposition and discharge plan. JULIUS Nguyen: Workup reviewed - Labs within normal limits/unremarkable/non-actionable at this time. Results endorsed including unexpected incidental findings (copy of reports provided to patient). Shared Decision Making - Reassessment performed, patient denies pain at time of reasssessment, patient requesting to be discharged home. Patient seen by Dental (left vestibular abscess drained by dental in ED), Dental recommends outpatient follow-up with [facility] within next 2-3 days for tooth #19 extraction, and discharge home with outpatient Augmentin 875 mg BID x 1 week. Patient is medically stable for discharge, patient comfortable with plan for discharge home. Strict return precautions given. Patient to follow up with PMD and outpatient [facility]. Patient displays understanding and agreeable with plan. Plan for discharge discussed with Dr. Lew who agrees with disposition and discharge plan.

## 2023-08-24 PROBLEM — E03.9 HYPOTHYROIDISM, UNSPECIFIED: Chronic | Status: ACTIVE | Noted: 2023-02-14

## 2023-09-05 ENCOUNTER — APPOINTMENT (OUTPATIENT)
Dept: OBGYN | Facility: CLINIC | Age: 53
End: 2023-09-05
Payer: COMMERCIAL

## 2023-09-05 VITALS
HEIGHT: 65 IN | DIASTOLIC BLOOD PRESSURE: 76 MMHG | BODY MASS INDEX: 30.49 KG/M2 | WEIGHT: 183 LBS | SYSTOLIC BLOOD PRESSURE: 109 MMHG

## 2023-09-05 DIAGNOSIS — Z01.419 ENCOUNTER FOR GYNECOLOGICAL EXAMINATION (GENERAL) (ROUTINE) W/OUT ABNORMAL FINDINGS: ICD-10-CM

## 2023-09-05 PROCEDURE — 99396 PREV VISIT EST AGE 40-64: CPT

## 2023-09-05 NOTE — DISCUSSION/SUMMARY
[FreeTextEntry1] : thin prep and HPV ordered. medical records copied. advised to f/u w recent repeat breast imaging and to have results fwd to our office.  discussed menopause treatment options-pt prefers holistic approach. rto in 1 year or soon for any gyn issues. pt verbalized understanding.

## 2023-09-05 NOTE — HISTORY OF PRESENT ILLNESS
[Patient reported mammogram was normal] : Patient reported mammogram was normal [Patient reported breast sonogram was normal] : Patient reported breast sonogram was normal [Patient reported PAP Smear was normal] : Patient reported PAP Smear was normal [Experiencing Menopausal Sxs] : experiencing menopausal symptoms [FreeTextEntry1] : 54 y/o  female, LMP: 10/2022, presents for annual GYN visit and c/o hot flashes and weight gain.  had breast imaging in may and had to repeat for birads 0: results pending. Patient denies any GYN complaints.   Sexual Activity: monogamous with . Social/Mental Health: Denies ETOH, tobacco or any illicit drug use.  Denies depression, anxiety, thoughts of personal harm or suicidal ideation.  ROS:  Denies fever/chills, HA, Cough/sore throat, CP, SOB, N/V, Diarrhea/Constipation, Pelvic pain, Urinary frequency/urgency/incontinence, irregular vaginal bleeding, discharge or irritation.    Medical History GYN HX:3 , fibroids, abnl pap, HGSIL PMH: Synthroid PSH: myomectomy 2022, cone bx, KI stones removed, strabismus sx Meds: Synthroid Allergies: NKDA fam hx: no updates [Mammogramdate] : 5/2023 [BreastSonogramDate] : 5/2023 [PapSmeardate] : 4/2019 [TextBox_43] : never

## 2023-09-08 LAB
CYTOLOGY CVX/VAG DOC THIN PREP: ABNORMAL
HPV HIGH+LOW RISK DNA PNL CVX: NOT DETECTED